# Patient Record
Sex: FEMALE | Race: BLACK OR AFRICAN AMERICAN
[De-identification: names, ages, dates, MRNs, and addresses within clinical notes are randomized per-mention and may not be internally consistent; named-entity substitution may affect disease eponyms.]

---

## 2017-06-01 ENCOUNTER — HOSPITAL ENCOUNTER (EMERGENCY)
Dept: HOSPITAL 61 - ER | Age: 72
Discharge: HOME | End: 2017-06-01
Payer: MEDICARE

## 2017-06-01 VITALS — SYSTOLIC BLOOD PRESSURE: 146 MMHG | DIASTOLIC BLOOD PRESSURE: 69 MMHG

## 2017-06-01 VITALS — HEIGHT: 63 IN | WEIGHT: 125 LBS | BODY MASS INDEX: 22.15 KG/M2

## 2017-06-01 DIAGNOSIS — R45.1: ICD-10-CM

## 2017-06-01 DIAGNOSIS — Z88.8: ICD-10-CM

## 2017-06-01 DIAGNOSIS — R53.1: Primary | ICD-10-CM

## 2017-06-01 DIAGNOSIS — N39.0: ICD-10-CM

## 2017-06-01 DIAGNOSIS — M19.90: ICD-10-CM

## 2017-06-01 LAB
ALBUMIN SERPL-MCNC: 3.6 G/DL (ref 3.4–5)
ALBUMIN/GLOB SERPL: 0.8 {RATIO} (ref 1–1.7)
ALP SERPL-CCNC: 56 U/L (ref 46–116)
ALT SERPL-CCNC: 22 U/L (ref 14–59)
ANION GAP SERPL CALC-SCNC: 10 MMOL/L (ref 6–14)
AST SERPL-CCNC: 16 U/L (ref 15–37)
BACTERIA #/AREA URNS HPF: 0 /HPF
BASOPHILS # BLD AUTO: 0 X10^3/UL (ref 0–0.2)
BASOPHILS NFR BLD: 0 % (ref 0–3)
BILIRUB SERPL-MCNC: 0.4 MG/DL (ref 0.2–1)
BILIRUB UR QL STRIP: NEGATIVE
BUN SERPL-MCNC: 10 MG/DL (ref 7–20)
BUN/CREAT SERPL: 14 (ref 6–20)
CALCIUM SERPL-MCNC: 8.6 MG/DL (ref 8.5–10.1)
CHLORIDE SERPL-SCNC: 103 MMOL/L (ref 98–107)
CO2 SERPL-SCNC: 30 MMOL/L (ref 21–32)
CREAT SERPL-MCNC: 0.7 MG/DL (ref 0.6–1)
EOSINOPHIL NFR BLD: 0 % (ref 0–3)
ERYTHROCYTE [DISTWIDTH] IN BLOOD BY AUTOMATED COUNT: 13.8 % (ref 11.5–14.5)
GFR SERPLBLD BASED ON 1.73 SQ M-ARVRAT: 100.4 ML/MIN
GLOBULIN SER-MCNC: 4.4 G/DL (ref 2.2–3.8)
GLUCOSE SERPL-MCNC: 88 MG/DL (ref 70–99)
GLUCOSE UR STRIP-MCNC: NEGATIVE MG/DL
HCT VFR BLD CALC: 35 % (ref 36–47)
HGB BLD-MCNC: 11.5 G/DL (ref 12–15.5)
LYMPHOCYTES # BLD: 1.4 X10^3/UL (ref 1–4.8)
LYMPHOCYTES NFR BLD AUTO: 13 % (ref 24–48)
MAGNESIUM SERPL-MCNC: 1.6 MG/DL (ref 1.8–2.4)
MCH RBC QN AUTO: 32 PG (ref 25–35)
MCHC RBC AUTO-ENTMCNC: 33 G/DL (ref 31–37)
MCV RBC AUTO: 96 FL (ref 79–100)
MONOCYTES NFR BLD: 4 % (ref 0–9)
NEUTROPHILS NFR BLD AUTO: 83 % (ref 31–73)
NITRITE UR QL STRIP: NEGATIVE
PH UR STRIP: 6 [PH]
PLATELET # BLD AUTO: 165 X10^3/UL (ref 140–400)
POTASSIUM SERPL-SCNC: 3.6 MMOL/L (ref 3.5–5.1)
PROT SERPL-MCNC: 8 G/DL (ref 6.4–8.2)
PROT UR STRIP-MCNC: NEGATIVE MG/DL
RBC # BLD AUTO: 3.63 X10^6/UL (ref 3.5–5.4)
RBC #/AREA URNS HPF: (no result) /HPF (ref 0–2)
SODIUM SERPL-SCNC: 143 MMOL/L (ref 136–145)
SP GR UR STRIP: 1.01
SQUAMOUS #/AREA URNS LPF: (no result) /LPF
UROBILINOGEN UR-MCNC: 0.2 MG/DL
WBC # BLD AUTO: 11.4 X10^3/UL (ref 4–11)
WBC #/AREA URNS HPF: (no result) /HPF (ref 0–4)

## 2017-06-01 PROCEDURE — 96366 THER/PROPH/DIAG IV INF ADDON: CPT

## 2017-06-01 PROCEDURE — A9540 TC99M MAA: HCPCS

## 2017-06-01 PROCEDURE — 84484 ASSAY OF TROPONIN QUANT: CPT

## 2017-06-01 PROCEDURE — 96361 HYDRATE IV INFUSION ADD-ON: CPT

## 2017-06-01 PROCEDURE — 83735 ASSAY OF MAGNESIUM: CPT

## 2017-06-01 PROCEDURE — A9558 XE133 XENON 10MCI: HCPCS

## 2017-06-01 PROCEDURE — 71010: CPT

## 2017-06-01 PROCEDURE — 96365 THER/PROPH/DIAG IV INF INIT: CPT

## 2017-06-01 PROCEDURE — 78582 LUNG VENTILAT&PERFUS IMAGING: CPT

## 2017-06-01 PROCEDURE — 87040 BLOOD CULTURE FOR BACTERIA: CPT

## 2017-06-01 PROCEDURE — 81001 URINALYSIS AUTO W/SCOPE: CPT

## 2017-06-01 PROCEDURE — 80053 COMPREHEN METABOLIC PANEL: CPT

## 2017-06-01 PROCEDURE — 36415 COLL VENOUS BLD VENIPUNCTURE: CPT

## 2017-06-01 PROCEDURE — 99285 EMERGENCY DEPT VISIT HI MDM: CPT

## 2017-06-01 PROCEDURE — 85027 COMPLETE CBC AUTOMATED: CPT

## 2017-06-01 PROCEDURE — 93005 ELECTROCARDIOGRAM TRACING: CPT

## 2017-06-01 PROCEDURE — 82310 ASSAY OF CALCIUM: CPT

## 2017-06-01 PROCEDURE — 96374 THER/PROPH/DIAG INJ IV PUSH: CPT

## 2017-06-01 PROCEDURE — 70450 CT HEAD/BRAIN W/O DYE: CPT

## 2017-06-01 PROCEDURE — 83605 ASSAY OF LACTIC ACID: CPT

## 2017-06-01 PROCEDURE — 85379 FIBRIN DEGRADATION QUANT: CPT

## 2017-06-01 PROCEDURE — 83880 ASSAY OF NATRIURETIC PEPTIDE: CPT

## 2017-06-01 NOTE — RAD
INDICATION: weakness and muscle twitching today, no priors, hx multiple 

myeloma

 

COMPARISON: None.

 

TECHNIQUE: 

 

Axial CT images obtained through the head without intravenous contrast.

 

One or more of the following individualized dose reduction techniques were

utilized for this examination:  1. Automated exposure control;  2. 

Adjustment of the mA and/or kV according to patient size;  3. Use of 

iterative reconstruction technique.

 

FINDINGS:

 

No intracranial hemorrhage.

No midline shift.  Basal cisterns patents.

Ventricles and sulci are globally prominent.

No acute osseous abnormality.

Orbits and paranasal sinuses unremarkable.

Scattered foci of low attenuation within the white matter.

 

IMPRESSION:

 

1. No acute intracranial hemorrhage.

2. Scattered regions of low attenuation within the white matter.  

Non-specific in nature but frequently secondary to small vessel ischemic 

disease. Other common causes include sequela of demyelination or migraine.

If there is clinical concern for acute etiology MRI could better evaluate 

as to whether any of these foci are acute.

3. Prominence of ventricles and sulci which is frequently secondary to age

related volume loss.

 

Electronically signed by: Tavon Cervantes MD (6/1/2017 5:54 PM)

## 2017-06-01 NOTE — RAD
Indication: Weakness with elevated d-dimer

 

Technique: Static images are obtained of both lungs following inhalation 

of 18 mCi xenon-133 and again following IV administration of 5 mCi of 99 M

technetium MAA. 

 

Comparison: Chest x-ray from same day

 

Findings:

No definite mismatched defects that are worse on the perfusion when 

compared to the ventilation images.

Patchy defects are seen both on the ventilation and perfusion images. Some

of the defects are better seen on the oblique and lateral images.

 

Impression: 

 

1. Low probability of pulmonary embolus.

 

Electronically signed by: Tavon Cervantes MD (6/1/2017 9:54 PM)

## 2017-06-01 NOTE — PHYS DOC
Past Medical History


Past Medical History:  Arthritis, GERD, Other


Additional Past Medical Histor:  Multiple Myeloma,Chemotherapy, cellulitis


Past Surgical History:  Knee Replacement, Other


Additional Past Surgical Histo:  Uterine prolapse,Stem Cell Transplant in 2013, 

cervical spinal stenosis


Alcohol Use:  None


Drug Use:  None





Adult General


Chief Complaint


Chief Complaint:  OTHER COMPLAINTS





[facility]


HPI





Patient is a 71  year old -American female who was in her normal state 

of health today until the  noted increased weakness with difficulty 

walking. She is given a history of multiple myeloma and leukemia as required 

chemotherapy and radiation therapy and she's had stem cell transplant 2. That 

was some years ago and she has been seen and managed by her oncologist at [facility]. She typically does not have any issue ambulating other than for 

some muscle rigidity but  noted this morning during some agitation that 

she had difficulty walking and sitting from a recumbent position to upright 

position. She admits to no change in medications, no changes in vision, no 

dysarthria, no arthralgia, no recent joint swelling, no shortness of breath, 

change chest pain, URI symptoms, fever, chills, headache, or other focal 

neurologic deficits. She globally feels weak any symptoms began this morning 

around 9 AM.


She denies any pain, travel outside the country, recent antibiotics, UTI 

symptoms or changes in bowel or bladder habits. She has a history of bladder 

surgery, partially directly, stem cell transplant 2, prior neck surgery.





[facility] primary care doctor, Dr. Quach is her oncologist. At .





Assessment and plan is generalized weakness. Patient is well outside the window 

for TPA consideration given her symptoms but differential diagnosis includes 

stroke, anemia, hypercalcemia, electrolyte abdomen, acute coronary syndrome, 

dehydration, UTI, sepsis, pulmonary embolism, as well as competitions from 

leukemia. At this point I will order a CT of the head, chest x-ray, EKG with 

appropriate cardiac workup, ionized calcium level,mag level, CMP, CBC, and 

urinalysis.





Review of Systems


Review of Systems





Constitutional: Denies fever or chills complains of generalized weakness


Eyes: Denies change in visual acuity, redness, or eye pain []


HENT: Denies nasal congestion or sore throat []


Respiratory: Denies cough or shortness of breath []


Cardiovascular: No additional information not addressed in HPI []


GI: Denies abdominal pain, nausea, vomiting, bloody stools or diarrhea []


: Denies dysuria or hematuria []


Musculoskeletal: Denies back pain or joint pain []


Integument: Denies rash or skin lesions []


Neurologic: Denies headache, she generally feels weak


Endocrine: Denies polyuria or polydipsia []





Current Medications


Current Medications





Current Medications








 Medications


  (Trade)  Dose


 Ordered  Sig/Sumit  Start Time


 Stop Time Status Last Admin


Dose Admin


 


 Magnesium Sulfate/


 Dextrose  50 ml @ 25


 mls/hr  1X  ONCE  17 18:45


 17 20:44 DC 17 19:22


25 MLS/HR


 


 Sodium Chloride  1,000 ml @ 


 1,000 mls/hr  Q1H  17 17:30


 17 18:29 DC 17 18:10


1,000 MLS/HR


 


 Sodium Chloride


  (Normal Saline


 Flush)  10 ml  QSHIFT  PRN  17 17:30


    17 19:22


10 ML











Allergies


Allergies





Allergies








Coded Allergies Type Severity Reaction Last Updated Verified


 


  metoclopramide HCl Allergy Unknown  3/26/14 No











Physical Exam


Physical Exam


A she noted to have a fever as well as tachycardia of 114 approprioate per for 

fever normotensive without hypoxia. Noted increased respiratory rate 22.


Constitutional: Well developed, well nourished, patient has extensive tardive 

dyskinesia but is in no distress.


HENT: Normocephalic, atraumatic, bilateral external ears normal, oropharynx 

moist, no oral exudates, nose normal. []


Eyes: PERRLA, EOMI, conjunctiva normal, no discharge. [] 


Neck: Normal range of motion, no tenderness, supple, no stridor. [] 


Cardiovascular:Heart rate regular rhythm, no murmur []


Lungs & Thorax:  Bilateral breath sounds clear to auscultation []


Abdomen: Bowel sounds normal, soft, no tenderness, no masses, no pulsatile 

masses. [] 


Skin: Warm, dry, no erythema, no rash. [] 


Back: No tenderness, no CVA tenderness. [] 


Extremities: No tenderness, no cyanosis, no clubbing, ROM intact, no edema. [] 


Neurologic: Alert and oriented X 3, normal motor function, normal sensory 

function, no focal deficits noted. Patient has decreased range of motion at the 

left shoulder secondary to pain not weakness. Patient's NIH stroke scale is 0 

at this time.


Psychologic: Affect normal, judgement normal, mood normal. []





Current Patient Data


Vital Signs





 Vital Signs








  Date Time  Temp Pulse Resp B/P (MAP) Pulse Ox O2 Delivery O2 Flow Rate FiO2


 


17 17:11 100.9 123 22 153/69 (97) 94 Room Air  





 100.9       








Lab Values





 Laboratory Tests








Test


  17


18:00


 


White Blood Count


  11.4 x10^3/uL


(4.0-11.0)  H


 


Red Blood Count


  3.63 x10^6/uL


(3.50-5.40)


 


Hemoglobin


  11.5 g/dL


(12.0-15.5)  L


 


Hematocrit


  35.0 %


(36.0-47.0)  L


 


Mean Corpuscular Volume


  96 fL ()


 


 


Mean Corpuscular Hemoglobin 32 pg (25-35)  


 


Mean Corpuscular Hemoglobin


Concent 33 g/dL


(31-37)


 


Red Cell Distribution Width


  13.8 %


(11.5-14.5)


 


Platelet Count


  165 x10^3/uL


(140-400)


 


Neutrophils (%) (Auto) 83 % (31-73)  H


 


Lymphocytes (%) (Auto) 13 % (24-48)  L


 


Monocytes (%) (Auto) 4 % (0-9)  


 


Eosinophils (%) (Auto) 0 % (0-3)  


 


Basophils (%) (Auto) 0 % (0-3)  


 


Neutrophils # (Auto)


  9.4 x10^3uL


(1.8-7.7)  H


 


Lymphocytes # (Auto)


  1.4 x10^3/uL


(1.0-4.8)


 


Monocytes # (Auto)


  0.5 x10^3/uL


(0.0-1.1)


 


Eosinophils # (Auto)


  0.0 x10^3/uL


(0.0-0.7)


 


Basophils # (Auto)


  0.0 x10^3/uL


(0.0-0.2)


 


D-Dimer (Dara)


  1.70 ug/mlFEU


(0.00-0.50)  H


 


Urine Collection Type U cath  


 


Urine Color Yellow  


 


Urine Clarity Clear  


 


Urine pH 6.0  


 


Urine Specific Gravity 1.015  


 


Urine Protein


  Negative mg/dL


(NEG-TRACE)


 


Urine Glucose (UA)


  Negative mg/dL


(NEG)


 


Urine Ketones (Stick)


  Negative mg/dL


(NEG)


 


Urine Blood Small (NEG)  


 


Urine Nitrite


  Negative (NEG)


 


 


Urine Bilirubin


  Negative (NEG)


 


 


Urine Urobilinogen Dipstick


  0.2 mg/dL (0.2


mg/dL)


 


Urine Leukocyte Esterase


  Negative (NEG)


 


 


Urine RBC


  6-10 /HPF


(0-2)


 


Urine WBC


  1-4 /HPF (0-4)


 


 


Urine Squamous Epithelial


Cells Few /LPF  


 


 


Urine Bacteria


  0 /HPF (0-FEW)


 


 


Urine Mucus Mod /LPF  


 


Sodium Level


  143 mmol/L


(136-145)


 


Potassium Level


  3.6 mmol/L


(3.5-5.1)


 


Chloride Level


  103 mmol/L


()


 


Carbon Dioxide Level


  30 mmol/L


(21-32)


 


Anion Gap 10 (6-14)  


 


Blood Urea Nitrogen


  10 mg/dL


(7-20)


 


Creatinine


  0.7 mg/dL


(0.6-1.0)


 


Estimated GFR


(Cockcroft-Gault) 100.4  


 


 


BUN/Creatinine Ratio 14 (6-20)  


 


Glucose Level


  88 mg/dL


(70-99)


 


Lactic Acid Level


  1.2 mmol/L


(0.4-2.0)


 


Calcium Level


  8.6 mg/dL


(8.5-10.1)


 


Ionized Calcium


  1.17 mmol/L


(1.13-1.32)


 


Magnesium Level


  1.6 mg/dL


(1.8-2.4)  L


 


Total Bilirubin


  0.4 mg/dL


(0.2-1.0)


 


Aspartate Amino Transferase


(AST) 16 U/L (15-37)


 


 


Alanine Aminotransferase (ALT)


  22 U/L (14-59)


 


 


Alkaline Phosphatase


  56 U/L


()


 


Troponin I Quantitative


  < 0.017 ng/mL


(0.000-0.055)


 


NT-Pro-B-Type Natriuretic


Peptide 42 pg/mL


(0-124)


 


Total Protein


  8.0 g/dL


(6.4-8.2)


 


Albumin


  3.6 g/dL


(3.4-5.0)


 


Albumin/Globulin Ratio


  0.8 (1.0-1.7)


L





 Laboratory Tests


17 18:00








 Laboratory Tests


17 18:00











EKG


EKG


[] EKG timed imaging 1716 2017 read by Dr. Romero demonstrates sinus 

tachycardia rate 114 is P waves. QRS normal limits at 67 ms patient has not 

severe T wave flattening in lateral leads. Patient is no ST segment or T-wave 

changes consistent with ischemia.





Radiology/Procedures


Radiology/Procedures


[] [address] [place] [address]


 [phone]


 


 IMAGING REPORT





 Signed





PATIENT: JUAN DIEGO MCLAIN ACCOUNT: [id number] MRN#: [id number]


: 1945 LOCATION: ER AGE: 71


SEX: F EXAM DT: 17 ACCESSION#: [id number]


STATUS: REG ER ORD. PHYSICIAN: CANDIDA ROMERO MD 


REASON: weakness


PROCEDURE: CT HEAD WO CONTRAST





 


INDICATION: weakness and muscle twitching today, no priors, hx multiple 


myeloma


 


COMPARISON: None.


 


TECHNIQUE: 


 


Axial CT images obtained through the head without intravenous contrast.


 


One or more of the following individualized dose reduction techniques were


utilized for this examination:  1. Automated exposure control;  2. 


Adjustment of the mA and/or kV according to patient size;  3. Use of 


iterative reconstruction technique.


 


FINDINGS:


 


No intracranial hemorrhage.


No midline shift.  Basal cisterns patents.


Ventricles and sulci are globally prominent.


No acute osseous abnormality.


Orbits and paranasal sinuses unremarkable.


Scattered foci of low attenuation within the white matter.


 


IMPRESSION:


 


1. No acute intracranial hemorrhage.


2. Scattered regions of low attenuation within the white matter.  


Non-specific in nature but frequently secondary to small vessel ischemic 


disease. Other common causes include sequela of demyelination or migraine.


If there is clinical concern for acute etiology MRI could better evaluate 


as to whether any of these foci are acute.


3. Prominence of ventricles and sulci which is frequently secondary to age


related volume loss.


 


Electronically signed by: Harris Padilla MD (2017 5:54 PM)














DICTATED and SIGNED BY:     HARRIS PADILLA MD


DATE:     17 1750





CC: CANDIDA ROMEOR MD; DANK SEO MD ~








 [facility]


 [address] [place] [address]


 [phone]


 


 IMAGING REPORT





 Signed





PATIENT: JUAN DIEGO MCLAIN ACCOUNT: [id number] MRN#: [id number]


: 1945 LOCATION: ER AGE: 71


SEX: F EXAM DT: 17 ACCESSION#: [id number]


STATUS: REG ER ORD. PHYSICIAN: CANDIDA ROMERO MD 


REASON: weakness with dimer elevation not eligable for CTA chest


PROCEDURE: LUNG VENT/PERFUSION SCAN(VQ)





 


Indication: Weakness with elevated d-dimer


 


Technique: Static images are obtained of both lungs following inhalation 


of 18 mCi xenon-133 and again following IV administration of 5 mCi of 99 M


technetium MAA. 


 


Comparison: Chest x-ray from same day


 


Findings:


No definite mismatched defects that are worse on the perfusion when 


compared to the ventilation images.


Patchy defects are seen both on the ventilation and perfusion images. Some


of the defects are better seen on the oblique and lateral images.


 


Impression: 


 


1. Low probability of pulmonary embolus.


 


Electronically signed by: Harris Padilla MD (2017 9:54 PM)














DICTATED and SIGNED BY:     HARRIS PADILLA MD


DATE:     17





CC: CANDIDA ROMERO MD; DANK SEO MD ~





Course & Med Decision Making


Course & Med Decision Making


Pertinent Labs and Imaging studies reviewed. (See chart for details) of the 

course the patient's evaluation the only abdomen mildly sound on laboratory 

work weren't elevated d-dimer which could represent a pulmonary most given her 

increased respiratory rate. Respiratory rate may be at normal baseline for her 

as she is deconditioned concerning her medical problems. So has low mag 1.6 

which may or may not contributory symptoms almost no evidence of hypokalemia or 

hypocalcemia. She'll be placed here in the emergency department. At this point 

results were explained to family at the bedside at approximately 1820. Patient 

feels better after fluids in the emergency. His urinalysis demonstrates mild 

white blood cells and bacteria are also has a few epithelial cells and given 

generalized weakness of patient with increased movement abnormalities I will 

treat empirically as UTI.





[] Patient tells me that their sumptoms given during CC are improved.  We 

reviewed labs and radiology reports with patient and any family at bedside. At 

approximately 1930. Still waiting for VQ scan.





Patient tells me that their sumptoms given during CC are improved.  We reviewed 

labs and radiology reports with patient and any family at bedside. Patient is a 

little agitated at this time waiting for VQ scan to be completed times a 

proximally 8:40 PM patient was offered a meal fluids and her gabapentin.





Consultant note:





Consultant called at of the service internal medicine PCP Dr. Gabbi herbert 

initially at 1852


Consult called back at 1900





Discussed the case I presented and they agreed with disposition home provided 

VQ scan is normal the patient is feeling better and able to walk with 

assistance according to the . At this point VQ scan is no returned at 

approximately 10 PM patient will be discharged home with follow-up with her 

primary care doctor with generalized weakness debility





Impression: Generalized weakness. Probable early UTI disposition PCP follow-up 

in 24-48 hours for weakness with no signs of stroke precautions given.





Dragon Disclaimer


Dragon Disclaimer


This electronic medical record was generated, in whole or in part, using a 

voice recognition dictation system.





Departure


Departure


Impression:  


 Primary Impression:  


 Weakness


 Additional Impression:  


 UTI (urinary tract infection)


Disposition:   HOME, SELF-CARE


Condition:  IMPROVED


Referrals:  


DANK SEO MD (PCP)


Patient Instructions:  Urinary Tract Infection, Weakness





Additional Instructions:  


These follow-up with your primary care doctor next 12-24 hours if your symptoms 

continue. Advise that you return immediately for any increased weakness 

generalized fatigue fevers greater than 102.2 despite treatment or if you any 

question concerns.


Scripts


Acetaminophen (TYLENOL) 325 Mg Tablet


1-2 TAB PO QID, #60 TAB 2 Refills


   Prov: CANDIDA ROMERO MD         17 


Ciprofloxacin Hcl (CIPRO) 500 Mg Tablet


1 TAB PO BID, #20 TAB


   Prov: CANDIDA ROMERO MD         17





Problem Qualifiers











CANDIDA ROMERO MD 2017 17:18

## 2017-06-02 NOTE — RAD
Exam:  AP portable chest. 



History:  Weakness. Multiple myeloma.



Comparison:  3/27/2014.



Findings:  The heart and mediastinal structures are within normal limits for

size.  Lungs are without infiltrate.  No pneumothorax or pleural effusion is

appreciated.  Right chest port and catheter are unchanged. Right greater than

left shoulder degeneration is present. S-shaped scoliosis of the thoracic and

upper lumbar spine is seen.



Impression:  

1.  No acute cardiopulmonary process.

## 2017-06-02 NOTE — EKG
[facility]

              [address] [place] [address]

Test Date:    2017               Test Time:    17:16:17

Pat Name:     JUAN DIEGO MCLAIN            Department:   

Patient ID:   [id number]           Room:          

Gender:       F                        Technician:   

:          1945               Requested By: CANDIDA ROMERO

Order Number: [id number]            Reading MD:   Pedro Cox

                                 Measurements

Intervals                              Axis          

Rate:         114                      P:            -40

DE:           116                      QRS:          7

QRSD:         76                       T:            11

QT:           292                                    

QTc:          406                                    

                           Interpretive Statements

SINUS TACHYCARDIA

NON SPECIFIC T ABNORMALITY

RI6.01          Unconfirmed report

Compared to ECG 2014 20:23:19

T-wave abnormality now present

Left-axis deviation no longer present



Electronically Signed On 2017 9:22:06 CDT by Pedro Cox

## 2017-09-19 ENCOUNTER — HOSPITAL ENCOUNTER (OUTPATIENT)
Dept: HOSPITAL 61 - SURG | Age: 72
Discharge: HOME | End: 2017-09-19
Attending: INTERNAL MEDICINE
Payer: MEDICARE

## 2017-09-19 VITALS
DIASTOLIC BLOOD PRESSURE: 70 MMHG | SYSTOLIC BLOOD PRESSURE: 160 MMHG | DIASTOLIC BLOOD PRESSURE: 70 MMHG | SYSTOLIC BLOOD PRESSURE: 160 MMHG | SYSTOLIC BLOOD PRESSURE: 160 MMHG | DIASTOLIC BLOOD PRESSURE: 70 MMHG

## 2017-09-19 DIAGNOSIS — K21.9: ICD-10-CM

## 2017-09-19 DIAGNOSIS — M19.91: ICD-10-CM

## 2017-09-19 DIAGNOSIS — Z86.69: ICD-10-CM

## 2017-09-19 DIAGNOSIS — Z87.19: ICD-10-CM

## 2017-09-19 DIAGNOSIS — Z87.39: ICD-10-CM

## 2017-09-19 DIAGNOSIS — F32.9: ICD-10-CM

## 2017-09-19 DIAGNOSIS — Z88.1: ICD-10-CM

## 2017-09-19 DIAGNOSIS — Z96.652: ICD-10-CM

## 2017-09-19 DIAGNOSIS — Z88.8: ICD-10-CM

## 2017-09-19 DIAGNOSIS — F41.9: ICD-10-CM

## 2017-09-19 DIAGNOSIS — Z09: Primary | ICD-10-CM

## 2019-06-04 ENCOUNTER — HOSPITAL ENCOUNTER (OUTPATIENT)
Dept: HOSPITAL 61 - US | Age: 74
Discharge: HOME | End: 2019-06-04
Attending: INTERNAL MEDICINE
Payer: MEDICARE

## 2019-06-04 DIAGNOSIS — M79.89: Primary | ICD-10-CM

## 2019-06-04 PROCEDURE — 93971 EXTREMITY STUDY: CPT

## 2019-06-04 NOTE — RAD
EXAM: Left lower extremity venous Doppler.



HISTORY: Left lower extremity pain/swelling.    



COMPARISON: None.



FINDINGS: Grayscale and Doppler analysis of the left lower extremity deep

venous system was performed with graded compression and augmentation. The

common femoral, greater saphenous, superficial femoral, popliteal and calf

veins were assessed.



There is no evidence of deep venous thrombosis.    



IMPRESSION: 

1. No evidence of deep venous thrombosis.

## 2019-08-19 ENCOUNTER — HOSPITAL ENCOUNTER (OUTPATIENT)
Dept: HOSPITAL 61 - CT | Age: 74
Discharge: HOME | End: 2019-08-19
Attending: INTERNAL MEDICINE
Payer: MEDICARE

## 2019-08-19 DIAGNOSIS — I67.82: Primary | ICD-10-CM

## 2019-08-19 PROCEDURE — 70450 CT HEAD/BRAIN W/O DYE: CPT

## 2019-08-19 PROCEDURE — 72125 CT NECK SPINE W/O DYE: CPT

## 2019-08-19 NOTE — RAD
PQRS Compliance statement:

 

One or more of the following individualized dose reduction techniques were

utilized for this examination:

1. Automated exposure control.

2. Adjustment of the mA and/or kV according to patient size.

3. Use of iterative reconstruction technique.

 

Indication:Fall. Left frontal headache and neck pain.

 

TECHNIQUE: CT head without IV contrast

 

COMPARISON: CT head from 6/1/2017

 

FINDINGS:

No pathologic extra-axial or intra-axial fluid collection. The ventricles 

and basal cisterns are within normal limits. Periventricular and deep 

white matter low-attenuation noted. No acute intracranial bleed. No focal 

loss of gray-white differentiation. Orbits are within normal limits. No 

large scalp hematoma. No acute calvarial fractures. Visualized paranasal 

sinuses and mastoid air cells are clear.

 

IMPRESSION:

1. No acute intracranial bleed or calvarial fracture.

2. White matter changes likely secondary to chronic microvascular ischemic

disease. If concern for acute ischemic stroke is high, please consider MRI

brain.

 

Indication:Neck pain.

 

TECHNIQUE: CT of the cervical spine without IV contrast with multiplanar 

reformats.

 

COMPARISON:None

 

FINDINGS:

There is loss of normal cervical lordosis. This could be due to muscle 

spasm, positioning or postoperative changes. Status post anterior fusion 

at C3-C4. Atlantoaxial joint interval is preserved. No compression 

deformity. Facet joints are in normal anatomic alignment. Multilevel 

intervertebral disc space narrowing seen with endplate irregularities and 

osteophyte formation. No acute fractures. Noncontrast appearance of the 

visualized neck soft tissue is within normal limits. Clear lung apices.

 

IMPRESSION:

1. No acute fractures.

2. Advanced multilevel degenerative disc disease with associated facet 

arthropathy.

 

Electronically signed by: Yasir Cao DO (8/19/2019 11:00 AM) [facility]

## 2019-10-24 ENCOUNTER — HOSPITAL ENCOUNTER (EMERGENCY)
Dept: HOSPITAL 61 - ER | Age: 74
Discharge: HOME | End: 2019-10-24
Payer: MEDICARE

## 2019-10-24 VITALS — DIASTOLIC BLOOD PRESSURE: 58 MMHG | SYSTOLIC BLOOD PRESSURE: 134 MMHG

## 2019-10-24 VITALS — HEIGHT: 62 IN | WEIGHT: 135 LBS | BODY MASS INDEX: 24.84 KG/M2
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
## 2019-10-24 DIAGNOSIS — C90.00: ICD-10-CM
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
## 2019-10-24 DIAGNOSIS — Z88.8: ICD-10-CM
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
## 2019-10-24 DIAGNOSIS — K21.9: ICD-10-CM

## 2019-10-24 DIAGNOSIS — R11.2: ICD-10-CM
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
## 2019-10-24 DIAGNOSIS — Z88.1: ICD-10-CM

## 2019-10-24 DIAGNOSIS — M19.90: ICD-10-CM

## 2019-10-24 DIAGNOSIS — Z96.659: ICD-10-CM

## 2019-10-24 DIAGNOSIS — E87.6: ICD-10-CM

## 2019-10-24 DIAGNOSIS — N39.0: Primary | ICD-10-CM

## 2019-10-24 DIAGNOSIS — R26.89: ICD-10-CM

## 2019-10-24 DIAGNOSIS — R50.9: ICD-10-CM

## 2019-10-24 LAB
ALBUMIN SERPL-MCNC: 3.7 G/DL (ref 3.4–5)
ALBUMIN/GLOB SERPL: 1.1 {RATIO} (ref 1–1.7)
ALP SERPL-CCNC: 66 U/L (ref 46–116)
ALT SERPL-CCNC: 22 U/L (ref 14–59)
ANION GAP SERPL CALC-SCNC: 7 MMOL/L (ref 6–14)
APTT PPP: YELLOW S
AST SERPL-CCNC: 19 U/L (ref 15–37)
BACTERIA #/AREA URNS HPF: 0 /HPF
BASOPHILS # BLD AUTO: 0 X10^3/UL (ref 0–0.2)
BASOPHILS NFR BLD: 1 % (ref 0–3)
BILIRUB SERPL-MCNC: 0.4 MG/DL (ref 0.2–1)
BILIRUB UR QL STRIP: NEGATIVE
BUN SERPL-MCNC: 8 MG/DL (ref 7–20)
BUN/CREAT SERPL: 13 (ref 6–20)
CALCIUM SERPL-MCNC: 9 MG/DL (ref 8.5–10.1)
CHLORIDE SERPL-SCNC: 105 MMOL/L (ref 98–107)
CK SERPL-CCNC: 219 U/L (ref 26–192)
CO2 SERPL-SCNC: 30 MMOL/L (ref 21–32)
CREAT SERPL-MCNC: 0.6 MG/DL (ref 0.6–1)
EOSINOPHIL NFR BLD: 0 % (ref 0–3)
EOSINOPHIL NFR BLD: 0 X10^3/UL (ref 0–0.7)
ERYTHROCYTE [DISTWIDTH] IN BLOOD BY AUTOMATED COUNT: 13.3 % (ref 11.5–14.5)
FIBRINOGEN PPP-MCNC: CLEAR MG/DL
GFR SERPLBLD BASED ON 1.73 SQ M-ARVRAT: 118.9 ML/MIN
GLOBULIN SER-MCNC: 3.4 G/DL (ref 2.2–3.8)
GLUCOSE SERPL-MCNC: 113 MG/DL (ref 70–99)
HCT VFR BLD CALC: 36.9 % (ref 36–47)
HGB BLD-MCNC: 12.3 G/DL (ref 12–15.5)
LYMPHOCYTES # BLD: 1.4 X10^3/UL (ref 1–4.8)
LYMPHOCYTES NFR BLD AUTO: 35 % (ref 24–48)
MAGNESIUM SERPL-MCNC: 1.8 MG/DL (ref 1.8–2.4)
MCH RBC QN AUTO: 33 PG (ref 25–35)
MCHC RBC AUTO-ENTMCNC: 33 G/DL (ref 31–37)
MCV RBC AUTO: 100 FL (ref 79–100)
MONO #: 0.3 X10^3/UL (ref 0–1.1)
MONOCYTES NFR BLD: 6 % (ref 0–9)
NEUT #: 2.4 X10^3/UL (ref 1.8–7.7)
NEUTROPHILS NFR BLD AUTO: 58 % (ref 31–73)
NITRITE UR QL STRIP: NEGATIVE
PH UR STRIP: 7 [PH]
PLATELET # BLD AUTO: 148 X10^3/UL (ref 140–400)
POTASSIUM SERPL-SCNC: 3.3 MMOL/L (ref 3.5–5.1)
PROT SERPL-MCNC: 7.1 G/DL (ref 6.4–8.2)
PROT UR STRIP-MCNC: NEGATIVE MG/DL
PROTHROMBIN TIME: 12.5 SEC (ref 11.7–14)
RBC # BLD AUTO: 3.68 X10^6/UL (ref 3.5–5.4)
RBC #/AREA URNS HPF: (no result) /HPF (ref 0–2)
SODIUM SERPL-SCNC: 142 MMOL/L (ref 136–145)
SQUAMOUS #/AREA URNS LPF: (no result) /LPF
UROBILINOGEN UR-MCNC: 0.2 MG/DL
WBC # BLD AUTO: 4.2 X10^3/UL (ref 4–11)
WBC #/AREA URNS HPF: (no result) /HPF (ref 0–4)

## 2019-10-24 PROCEDURE — 70450 CT HEAD/BRAIN W/O DYE: CPT

## 2019-10-24 PROCEDURE — 93005 ELECTROCARDIOGRAM TRACING: CPT

## 2019-10-24 PROCEDURE — 99285 EMERGENCY DEPT VISIT HI MDM: CPT

## 2019-10-24 PROCEDURE — 84484 ASSAY OF TROPONIN QUANT: CPT

## 2019-10-24 PROCEDURE — 36415 COLL VENOUS BLD VENIPUNCTURE: CPT

## 2019-10-24 PROCEDURE — 96361 HYDRATE IV INFUSION ADD-ON: CPT

## 2019-10-24 PROCEDURE — 83605 ASSAY OF LACTIC ACID: CPT

## 2019-10-24 PROCEDURE — 81001 URINALYSIS AUTO W/SCOPE: CPT

## 2019-10-24 PROCEDURE — 80053 COMPREHEN METABOLIC PANEL: CPT

## 2019-10-24 PROCEDURE — 83880 ASSAY OF NATRIURETIC PEPTIDE: CPT

## 2019-10-24 PROCEDURE — 96375 TX/PRO/DX INJ NEW DRUG ADDON: CPT

## 2019-10-24 PROCEDURE — 83735 ASSAY OF MAGNESIUM: CPT

## 2019-10-24 PROCEDURE — 87186 SC STD MICRODIL/AGAR DIL: CPT

## 2019-10-24 PROCEDURE — 82550 ASSAY OF CK (CPK): CPT

## 2019-10-24 PROCEDURE — 96374 THER/PROPH/DIAG INJ IV PUSH: CPT

## 2019-10-24 PROCEDURE — 72125 CT NECK SPINE W/O DYE: CPT

## 2019-10-24 PROCEDURE — 87086 URINE CULTURE/COLONY COUNT: CPT

## 2019-10-24 PROCEDURE — 85610 PROTHROMBIN TIME: CPT

## 2019-10-24 PROCEDURE — 85025 COMPLETE CBC W/AUTO DIFF WBC: CPT

## 2019-10-24 PROCEDURE — 71045 X-RAY EXAM CHEST 1 VIEW: CPT

## 2019-10-24 NOTE — PHYS DOC
Past Medical History


Past Medical History:  Arthritis, GERD, Other


Additional Past Medical Histor:  Multiple Myeloma,Chemotherapy, cellulitis


Past Surgical History:  Knee Replacement, Other


Additional Past Surgical Histo:  Uterine prolapse,Stem Cell Transplant in 2013, 

cervical spinal stenosis


Alcohol Use:  None


Drug Use:  None





Adult General


Chief Complaint


Chief Complaint:  MECHANICAL FALL





HPI


HPI





Patient is a 74  year old female who presents with complaining of a fall. 

Patient states she lost her balance and had a fall inside of her home and hit 

her head without loss of consciousness. Patient had another fall 2 weeks ago and

currently taking physical therapy treatment for balance problem that is going on

for several weeks. X-ray, fever and chills, neck pain, nausea and vomiting. 

Patient currently using a walker. Patient takes fentanyl patch and oxycodone for

multiple myeloma pain during of headache and rated her pain 6/10.





Review of Systems


Review of Systems





Constitutional: Denies fever or chills []


Eyes: Denies change in visual acuity, redness, or eye pain []


HENT: Denies nasal congestion or sore throat []


Respiratory: Denies cough or shortness of breath []


Cardiovascular: No additional information not addressed in HPI []


GI: Denies abdominal pain, nausea, vomiting, bloody stools or diarrhea []


: Denies dysuria or hematuria []


Musculoskeletal: Denies back pain or joint pain []


Integument: Denies rash or skin lesions []


Neurologic: Denies  focal weakness or sensory changes, reports []


Endocrine: Denies polyuria or polydipsia []





All other systems were reviewed and found to be within normal limits, except as 

documented in this note.





Current Medications


Current Medications





Current Medications








 Medications


  (Trade)  Dose


 Ordered  Sig/Sumit  Start Time


 Stop Time Status Last Admin


Dose Admin


 


 Fentanyl Citrate


  (Fentanyl 2ml


 Vial)  50 mcg  1X  ONCE  10/24/19 18:45


 10/24/19 18:46 DC 10/24/19 19:07


50 MCG


 


 Heparin Sodium


  (Porcine)


  (Hep Lock Adult)  500 unit  1X  ONCE  10/24/19 21:45


 10/24/19 21:46   





 


 Potassium Chloride


  (Klor-Con)  40 meq  1X  ONCE  10/24/19 21:30


 10/24/19 21:31 DC  





 


 Sodium Chloride  1,000 ml @ 


 1,000 mls/hr  1X  ONCE  10/24/19 19:30


 10/24/19 20:29 DC 10/24/19 19:59


1,000 MLS/HR











Allergies


Allergies





Allergies








Coded Allergies Type Severity Reaction Last Updated Verified


 


  amoxicillin Allergy Intermediate  17 Yes


 


  clavulanic acid Allergy Intermediate  17 Yes


 


  metoclopramide HCl Adverse Reaction Intermediate muscle tremors 10/24/19 No











Physical Exam


Physical Exam








Constitutional: Well developed, well nourished, mild distress, non-toxic 

appearance. []


HENT: Normocephalic, atraumatic.


Eyes: PERRLA, EOMI, conjunctiva normal, no discharge. [] 


Neck: Normal range of motion, no tenderness, supple, no stridor. [] 


Cardiovascular:Heart rate regular rhythm, no murmur []


Lungs & Thorax:  Bilateral breath sounds clear to auscultation []


Abdomen: Bowel sounds normal, soft, no tenderness, no masses, no pulsatile 

masses. [] 


Skin: Warm, dry, no erythema, no rash. [] 


Back: No tenderness, no CVA tenderness. [] 


Extremities: No tenderness, no cyanosis, no clubbing, ROM intact, no edema. [] 


Neurologic: Alert and oriented X 3, no focal deficits noted. []


Psychologic: Affect normal, judgement normal, mood normal. []





Current Patient Data


Vital Signs





                                   Vital Signs








  Date Time  Temp Pulse Resp B/P (MAP) Pulse Ox O2 Delivery O2 Flow Rate FiO2


 


10/24/19 19:30      Room Air  


 


10/24/19 19:07   16  97   


 


10/24/19 18:11 98.4 82  153/69 (97)    





 98.4       








Lab Values





                                Laboratory Tests








Test


 10/24/19


18:48 10/24/19


20:47


 


White Blood Count


 4.2 x10^3/uL


(4.0-11.0) 





 


Red Blood Count


 3.68 x10^6/uL


(3.50-5.40) 





 


Hemoglobin


 12.3 g/dL


(12.0-15.5) 





 


Hematocrit


 36.9 %


(36.0-47.0) 





 


Mean Corpuscular Volume


 100 fL


() 





 


Mean Corpuscular Hemoglobin 33 pg (25-35)   


 


Mean Corpuscular Hemoglobin


Concent 33 g/dL


(31-37) 





 


Red Cell Distribution Width


 13.3 %


(11.5-14.5) 





 


Platelet Count


 148 x10^3/uL


(140-400) 





 


Neutrophils (%) (Auto) 58 % (31-73)   


 


Lymphocytes (%) (Auto) 35 % (24-48)   


 


Monocytes (%) (Auto) 6 % (0-9)   


 


Eosinophils (%) (Auto) 0 % (0-3)   


 


Basophils (%) (Auto) 1 % (0-3)   


 


Neutrophils # (Auto)


 2.4 x10^3/uL


(1.8-7.7) 





 


Lymphocytes # (Auto)


 1.4 x10^3/uL


(1.0-4.8) 





 


Monocytes # (Auto)


 0.3 x10^3/uL


(0.0-1.1) 





 


Eosinophils # (Auto)


 0.0 x10^3/uL


(0.0-0.7) 





 


Basophils # (Auto)


 0.0 x10^3/uL


(0.0-0.2) 





 


Prothrombin Time


 12.5 SEC


(11.7-14.0) 





 


Prothrombin Time INR 1.0 (0.8-1.1)   


 


Sodium Level


 142 mmol/L


(136-145) 





 


Potassium Level


 3.3 mmol/L


(3.5-5.1)  L 





 


Chloride Level


 105 mmol/L


() 





 


Carbon Dioxide Level


 30 mmol/L


(21-32) 





 


Anion Gap 7 (6-14)   


 


Blood Urea Nitrogen


 8 mg/dL (7-20)


 





 


Creatinine


 0.6 mg/dL


(0.6-1.0) 





 


Estimated GFR


(Cockcroft-Gault) 118.9  


 





 


BUN/Creatinine Ratio 13 (6-20)   


 


Glucose Level


 113 mg/dL


(70-99)  H 





 


Lactic Acid Level


 0.7 mmol/L


(0.4-2.0) 





 


Calcium Level


 9.0 mg/dL


(8.5-10.1) 





 


Magnesium Level


 1.8 mg/dL


(1.8-2.4) 





 


Total Bilirubin


 0.4 mg/dL


(0.2-1.0) 





 


Aspartate Amino Transferase


(AST) 19 U/L (15-37)


 





 


Alanine Aminotransferase (ALT)


 22 U/L (14-59)


 





 


Alkaline Phosphatase


 66 U/L


() 





 


Creatine Kinase


 219 U/L


()  H 





 


Troponin I Quantitative


 < 0.017 ng/mL


(0.000-0.055) 





 


NT-Pro-B-Type Natriuretic


Peptide 299 pg/mL


(0-124)  H 





 


Total Protein


 7.1 g/dL


(6.4-8.2) 





 


Albumin


 3.7 g/dL


(3.4-5.0) 





 


Albumin/Globulin Ratio 1.1 (1.0-1.7)   


 


Urine Collection Type  Unknown  


 


Urine Color  Yellow  


 


Urine Clarity  Clear  


 


Urine pH  7.0  


 


Urine Specific Gravity  1.015  


 


Urine Protein


 


 Negative mg/dL


(NEG-TRACE)


 


Urine Glucose (UA)


 


 Negative mg/dL


(NEG)


 


Urine Ketones (Stick)


 


 Negative mg/dL


(NEG)


 


Urine Blood


 


 Negative (NEG)





 


Urine Nitrite


 


 Negative (NEG)





 


Urine Bilirubin


 


 Negative (NEG)





 


Urine Urobilinogen Dipstick


 


 0.2 mg/dL (0.2


mg/dL)


 


Urine Leukocyte Esterase


 


 Moderate (NEG)





 


Urine RBC


 


 3-5 /HPF (0-2)





 


Urine WBC


 


 5-10 /HPF


(0-4)


 


Urine Squamous Epithelial


Cells 


 Mod /LPF  





 


Urine Bacteria


 


 0 /HPF (0-FEW)





 


Urine Mucus  Slight /LPF  





                                Laboratory Tests


10/24/19 18:48








                                Laboratory Tests


10/24/19 18:48














EKG


EKG


EKG interpreted by me. EKG at 1913 showed normal sinus rhythm at rate of 73, 

leftward axis, T-wave abnormalities in anteroseptal leads, no acute ST and T-wa

ve lavation.





Radiology/Procedures


Radiology/Procedures


[][facility]


                    [address] [place] [address] [phone]


                                        


                                 IMAGING REPORT





                                     Signed





PATIENT: JUAN DIEGO MCLAIN  ACCOUNT: [id number]     MRN#: [id number]


: 1945           LOCATION: ER              AGE: 74


SEX: F                    EXAM DT: 10/24/19         ACCESSION#: [id number]


STATUS: REG ER            ORD. PHYSICIAN: MARYANN ROY MD


REASON: weakness and fall


PROCEDURE: PORTABLE CHEST 1V





 


AP portable chest  radiograph 10/24/2019


 


Clinical History: Weakness. Fall.


 


An AP erect portable digital radiograph of the chest was obtained.


 


Comparison study is dated 2017.


 


A right internal jugular Vqppec-n-Mbdq type catheter is unchanged in 


position. The cardiac silhouette is mildly enlarged. The thoracic aorta is


tortuous. Atherosclerotic calcification of the thoracic aorta is seen. The


degree of inspiration is shallow. No area of consolidation is seen. No 


pneumothorax or pleural effusion is noted. Degenerative changes are seen 


involving the thoracic spine and both shoulders. The osseous structures 


are grossly intact.


 


Impression: No acute abnormality is seen.


 


Electronically signed by: Callum Arreguin MD (10/24/2019 8:28 PM) [facility]














DICTATED and SIGNED BY:     CALLUM ARREGUIN MD


DATE:     10/24/19 2028


                            [facility]


                    [address] [place] [address]


                                 [phone]


                                        


                                 IMAGING REPORT





                                     Signed





PATIENT: JUAN DIEGO MCLAIN  ACCOUNT: [id number]     MRN#: [id number]


: 1945           LOCATION: ER              AGE: 74


SEX: F                    EXAM DT: 10/24/19         ACCESSION#: [id number]


STATUS: REG ER            ORD. PHYSICIAN: MARYANN ROY MD


REASON: fall at  home, history of multiple myeloma


PROCEDURE: CT HEAD AND CERVICAL SPINE WO





Exam: CT head and cervical spine


 


INDICATION: Fall at home


 


TECHNIQUE: Sequential axial images through the head and cervical spine 


were obtained without the administration of IV contrast.


 


Comparisons: None


 


FINDINGS:


 


Head:


No focal parenchymal lesion or hemorrhage is identified. There is no 


midline shift or sulcal effacement.


 


Patchy hypodensity within the periventricular white matter, likely 


representing chronic small vessel ischemic change, similar when compared 


to prior exam. No acute vascular territory infarction is identified. 


Gray-white distinction is preserved.


 


The ventricular system is within normal limits without compression 


hydrocephalus. The basal cisterns are well maintained.


 


The visualized portions of the paranasal sinuses and mastoid air cells are


well-pneumatized. No acute fractures.


 


Cervical spine:


There is straightening of the cervical spine. Vertebral body heights are 


well-maintained. Grade 1 anterolisthesis of C2 on C3 and C7 on T1.


 


Fracture through the cervical spine is not identified.


 


There is multilevel spondylotic change in the cervical spine with 


degenerative disc disease greatest at C3-C4 C5-C6 and C6-C7.


 


Visualized paraspinal soft tissues are unremarkable.


 


IMPRESSION:


1.  No acute intracranial abnormality.


2.  Negative CT C-spine for acute traumatic injury.


 


Exposure: One or more of the following in the visualized dose reduction 


techniques were utilized for this examination:


1.  Automated exposure control


2.  Adjustment of the MA and/or KV according to patient size


Use of iterative of reconstructive technique


 


Electronically signed by: Mateo Rodriguez MD (10/24/2019 7:46 PM) [facility]














DICTATED and SIGNED BY:     MATEO RODRIGUEZ MD


DATE:     10/24/19 1946





Course & Med Decision Making


Course & Med Decision Making


Pertinent Labs and Imaging studies reviewed. (See chart for details)





Evaluation of patient in ER showed 74-year-old female patient with history of 

multiple myeloma and fentanyl patch and Percocet and history of bunions problem 

for several weeks and physical therapy brought in by her  because of a 

fall and injury to her head. Patient was alert and oriented. Better after IV 

fentanyl. Potassium was 3.3 and patient states she is supposed respiratory 

symptoms but doesn't take her medication all the time because large size of the 

medication. Patient also had UTI. Lactic acid and white count was normal. CT 

head and chest x-ray was unremarkable. Patient advised to increase fluid intake 

and follow up with physical therapy. Patient ambulated with walking like her 

usual condition according to her .





Dragon Disclaimer


Dragon Disclaimer


This electronic medical record was generated, in whole or in part, using a voice

 recognition dictation system.





Departure


Departure


Impression:  


   Primary Impression:  


   Fall from standing


   Additional Impressions:  


   Balance problem


   Urinary tract infection


   Hypokalemia


   History of multiple myeloma


Disposition:  01 HOME, SELF-CARE (at 17)


Condition:  IMPROVED


Referrals:  


HAMILTON GUTIERREZ MD (PCP)


Patient Instructions:  Fall Prevention and Home Safety, Hypokalemia, Urinary 

Tract Infection





Additional Instructions:  


Drink plenty of liquids


Follow-up with your primary care physician in 3-5 days


Return to ER if not getting better


Continue with your physical therapist for balance problem


Scripts


Ciprofloxacin Hcl (CIPRO) 250 Mg Tablet


1 TAB PO BID for infection, #14 TAB


   Prov: MARYANN ROY MD         10/24/19





Problem Qualifiers








   Primary Impression:  


   Fall from standing


   Encounter type:  initial encounter  Qualified Codes:  W19.XXXA - Unspecified 

   fall, initial encounter


   Additional Impressions:  


   Urinary tract infection


   Urinary tract infection type:  site unspecified  Hematuria presence:  without

    hematuria  Qualified Codes:  N39.0 - Urinary tract infection, site not 

   specified








MARYANN ROY MD             Oct 24, 2019 21:19

## 2019-10-24 NOTE — RAD
Exam: CT head and cervical spine

 

INDICATION: Fall at home

 

TECHNIQUE: Sequential axial images through the head and cervical spine 

were obtained without the administration of IV contrast.

 

Comparisons: None

 

FINDINGS:

 

Head:

No focal parenchymal lesion or hemorrhage is identified. There is no 

midline shift or sulcal effacement.

 

Patchy hypodensity within the periventricular white matter, likely 

representing chronic small vessel ischemic change, similar when compared 

to prior exam. No acute vascular territory infarction is identified. 

Gray-white distinction is preserved.

 

The ventricular system is within normal limits without compression 

hydrocephalus. The basal cisterns are well maintained.

 

The visualized portions of the paranasal sinuses and mastoid air cells are

well-pneumatized. No acute fractures.

 

Cervical spine:

There is straightening of the cervical spine. Vertebral body heights are 

well-maintained. Grade 1 anterolisthesis of C2 on C3 and C7 on T1.

 

Fracture through the cervical spine is not identified.

 

There is multilevel spondylotic change in the cervical spine with 

degenerative disc disease greatest at C3-C4 C5-C6 and C6-C7.

 

Visualized paraspinal soft tissues are unremarkable.

 

IMPRESSION:

1.  No acute intracranial abnormality.

2.  Negative CT C-spine for acute traumatic injury.

 

Exposure: One or more of the following in the visualized dose reduction 

techniques were utilized for this examination:

1.  Automated exposure control

2.  Adjustment of the MA and/or KV according to patient size

Use of iterative of reconstructive technique

 

Electronically signed by: Mateo Perez MD (10/24/2019 7:46 PM) [facility]

## 2019-10-24 NOTE — RAD
AP portable chest  radiograph 10/24/2019

 

Clinical History: Weakness. Fall.

 

An AP erect portable digital radiograph of the chest was obtained.

 

Comparison study is dated 1/1/2017.

 

A right internal jugular Zjmpsu-z-Vnxk type catheter is unchanged in 

position. The cardiac silhouette is mildly enlarged. The thoracic aorta is

tortuous. Atherosclerotic calcification of the thoracic aorta is seen. The

degree of inspiration is shallow. No area of consolidation is seen. No 

pneumothorax or pleural effusion is noted. Degenerative changes are seen 

involving the thoracic spine and both shoulders. The osseous structures 

are grossly intact.

 

Impression: No acute abnormality is seen.

 

Electronically signed by: Callum Arreguin MD (10/24/2019 8:28 PM) [facility]

## 2019-10-25 NOTE — EKG
[facility]

              [address] [place] [address]

Test Date:    2019-10-24               Test Time:    19:13:52

Pat Name:     JUAN DIEGO MCLAIN            Department:   

Patient ID:   [id number]           Room:          

Gender:       F                        Technician:   

:          1945               Requested By: MARYANN ROY

Order Number: [id number]           Reading MD:   Dontae Joshi MD

                                 Measurements

Intervals                              Axis          

Rate:         73                       P:            0

NJ:           154                      QRS:          -12

QRSD:         94                       T:            -25

QT:           390                                    

QTc:          433                                    

                           Interpretive Statements

SINUS RHYTHM

POOR R WAVE PROGRESSION

Electronically Signed On 2019 8:21:30 CST by Dontae Joshi MD

## 2021-08-22 ENCOUNTER — HOSPITAL ENCOUNTER (EMERGENCY)
Dept: HOSPITAL 61 - ER | Age: 76
Discharge: HOME | End: 2021-08-22
Payer: MEDICARE

## 2021-08-22 VITALS — DIASTOLIC BLOOD PRESSURE: 64 MMHG | SYSTOLIC BLOOD PRESSURE: 146 MMHG

## 2021-08-22 VITALS — BODY MASS INDEX: 26.53 KG/M2 | HEIGHT: 60 IN | WEIGHT: 135.14 LBS

## 2021-08-22 DIAGNOSIS — F41.9: ICD-10-CM

## 2021-08-22 DIAGNOSIS — K21.9: ICD-10-CM

## 2021-08-22 DIAGNOSIS — Z88.8: ICD-10-CM

## 2021-08-22 DIAGNOSIS — R25.1: Primary | ICD-10-CM

## 2021-08-22 DIAGNOSIS — Z88.1: ICD-10-CM

## 2021-08-22 LAB
ALBUMIN SERPL-MCNC: 3.3 G/DL (ref 3.4–5)
ALBUMIN/GLOB SERPL: 0.8 {RATIO} (ref 1–1.7)
ALP SERPL-CCNC: 68 U/L (ref 46–116)
ALT SERPL-CCNC: 25 U/L (ref 14–59)
ANION GAP SERPL CALC-SCNC: 11 MMOL/L (ref 6–14)
AST SERPL-CCNC: 12 U/L (ref 15–37)
BASOPHILS # BLD AUTO: 0 X10^3/UL (ref 0–0.2)
BASOPHILS NFR BLD: 1 % (ref 0–3)
BILIRUB SERPL-MCNC: 0.5 MG/DL (ref 0.2–1)
BUN SERPL-MCNC: 8 MG/DL (ref 7–20)
BUN/CREAT SERPL: 11 (ref 6–20)
CALCIUM SERPL-MCNC: 9.1 MG/DL (ref 8.5–10.1)
CHLORIDE SERPL-SCNC: 103 MMOL/L (ref 98–107)
CO2 SERPL-SCNC: 30 MMOL/L (ref 21–32)
CREAT SERPL-MCNC: 0.7 MG/DL (ref 0.6–1)
EOSINOPHIL NFR BLD: 0.1 X10^3/UL (ref 0–0.7)
EOSINOPHIL NFR BLD: 2 % (ref 0–3)
ERYTHROCYTE [DISTWIDTH] IN BLOOD BY AUTOMATED COUNT: 16.2 % (ref 11.5–14.5)
GFR SERPLBLD BASED ON 1.73 SQ M-ARVRAT: 99 ML/MIN
GLUCOSE SERPL-MCNC: 92 MG/DL (ref 70–99)
HCT VFR BLD CALC: 32.6 % (ref 36–47)
HGB BLD-MCNC: 10.9 G/DL (ref 12–15.5)
LIPASE: 99 U/L (ref 73–393)
LYMPHOCYTES # BLD: 1.2 X10^3/UL (ref 1–4.8)
LYMPHOCYTES NFR BLD AUTO: 34 % (ref 24–48)
MAGNESIUM SERPL-MCNC: 1.6 MG/DL (ref 1.8–2.4)
MCH RBC QN AUTO: 33 PG (ref 25–35)
MCHC RBC AUTO-ENTMCNC: 34 G/DL (ref 31–37)
MCV RBC AUTO: 97 FL (ref 79–100)
MONO #: 0.4 X10^3/UL (ref 0–1.1)
MONOCYTES NFR BLD: 11 % (ref 0–9)
NEUT #: 1.8 X10^3/UL (ref 1.8–7.7)
NEUTROPHILS NFR BLD AUTO: 53 % (ref 31–73)
PLATELET # BLD AUTO: 210 X10^3/UL (ref 140–400)
POTASSIUM SERPL-SCNC: 3.4 MMOL/L (ref 3.5–5.1)
PROT SERPL-MCNC: 7.2 G/DL (ref 6.4–8.2)
RBC # BLD AUTO: 3.34 X10^6/UL (ref 3.5–5.4)
SODIUM SERPL-SCNC: 144 MMOL/L (ref 136–145)
WBC # BLD AUTO: 3.5 X10^3/UL (ref 4–11)

## 2021-08-22 PROCEDURE — 83880 ASSAY OF NATRIURETIC PEPTIDE: CPT

## 2021-08-22 PROCEDURE — 85025 COMPLETE CBC W/AUTO DIFF WBC: CPT

## 2021-08-22 PROCEDURE — 84484 ASSAY OF TROPONIN QUANT: CPT

## 2021-08-22 PROCEDURE — 83735 ASSAY OF MAGNESIUM: CPT

## 2021-08-22 PROCEDURE — 80053 COMPREHEN METABOLIC PANEL: CPT

## 2021-08-22 PROCEDURE — 70450 CT HEAD/BRAIN W/O DYE: CPT

## 2021-08-22 PROCEDURE — 36415 COLL VENOUS BLD VENIPUNCTURE: CPT

## 2021-08-22 PROCEDURE — 71045 X-RAY EXAM CHEST 1 VIEW: CPT

## 2021-08-22 PROCEDURE — 83690 ASSAY OF LIPASE: CPT

## 2021-08-22 NOTE — RAD
CT Head W/O Contrast:



History: Reason: ams h/o mm / Spl. Instructions:  / History: 



Comparison: none



Axial images were obtained without contrast.



There is moderate diffuse atrophy.  There is no mass effect, extraaxial fluid collections or hydrocep
halus.  There is no gross bleed.  Mild, patchy periventricular and subcortical white matter hypoatten
uation is seen.  There is no focal loss of gray-white matter distinction to suggest acute ischemia, i
.e. stroke.



Impression:  No acute findings.



RS Compliance Statement:



One or more of the following individualized dose reduction techniques were utilized for this examinat
ion:  

1. Automated exposure control  

2. Adjustment of the mA and/or kV according to patient size  

3. Use of iterative reconstruction technique



Electronically signed by: Michael Arthur III, MD (8/22/2021 3:40 PM) [facility]

## 2021-08-22 NOTE — RAD
XR CHEST 1V



History: Reason: near-syncope / Spl. Instructions:  / History: 



Comparison: October 24, 2019



Findings:

Patchy bibasilar opacities, left than right. No pleural effusion. No pneumothorax. Normal heart size.
 Stable right chest wall port. Advanced glenohumeral DJD, right greater than left.



Impression: 

1.  Mild left greater than right patchy bibasilar opacities, may represent atelectasis or developing 
consolidations



Electronically signed by: Maximilian Brasher DO (8/22/2021 2:51 PM) [id number]

## 2021-08-22 NOTE — PHYS DOC
Past Medical History


Past Medical History:  Arthritis, GERD, Other


Additional Past Medical Histor:  Multiple Myeloma,Chemotherapy, cellulitis.


Past Surgical History:  Knee Replacement, Other


Additional Past Surgical Histo:  Uterine prolapse,Stem Cell Transplant in 2013, 

cervical spinal stenosis


Smoking Status:  Never Smoker


Alcohol Use:  None


Drug Use:  None





General Adult


EDM:


Chief Complaint:  NEAR SYNCOPE





HPI:


HPI:


76-year-old -American female past medical history of multiple myeloma, 

follows at , presents to the ED with her , (patient consents to 

his/her/their knowledge and involvement in pts' medical care), concern for " I 

was acting fidgety and not walking well at all," 's lasted for 

approximately 10 to 20 minutes and symptoms resolved prior to ED arrival.  

Patient states she had similar symptoms approximately 1-2 weeks ago that they 

attempted to be seen at  emergency department but left due to long ED wait 

time.  Patients' pcp is Dr. Gutierrez.  Patient is taking Pomalyst every 21 days 

and has 1 week off.  Patient states she felt really nervous when these sxs 

happened and she was talking quickly, "I know I have some anxiety that I need to

talk to Gabbi about." States her arms were "fidgeting," no sxs in her legs. 

Has a h/o neuropathy but takes no antipsychotics.  Patient denies any associated

dizziness, vertiginous sensation, chest pain, dyspnea, exertional dyspnea, 

saddle anesthesia, back pain, night sweats, urinary or bowel retention or 

incontinence, unintended weight loss or arm/leg weakness.





Review of Systems:


Review of Systems:


Constitutional:   Denies fever or chills. []


Eyes:   Denies change in visual acuity. []


HENT:   Denies nasal congestion or sore throat. [] 


Respiratory:   Denies cough or shortness of breath. [] 


Cardiovascular:   Denies chest pain or edema. [] 


GI:   Denies  nausea, vomiting, bloody stools or diarrhea. [] 


:  Denies dysuria or hematuria


Musculoskeletal:   Denies back pain or joint pain. [] 


Integument:   Denies rash or diaphoresis


Neurologic:   Denies headache, focal weakness or sensory changes. [] 


Endocrine:   Denies polyuria or polydipsia. [] 


Lymphatic:  Denies swollen glands. [] 


Psychiatric:  Denies depression or suicidal thoughts





Heart Score:


C/O Chest Pain:  No


Risk Factors:


Risk Factors:  DM, Current or recent (<one month) smoker, HTN, HLP, family 

history of CAD, obesity.


Risk Scores:


Score 0 - 3:  2.5% MACE over next 6 weeks - Discharge Home


Score 4 - 6:  20.3% MACE over next 6 weeks - Admit for Clinical Observation


Score 7 - 10:  72.7% MACE over next 6 weeks - Early Invasive Strategies





Allergies:


Allergies:





Allergies








Coded Allergies Type Severity Reaction Last Updated Verified


 


  amoxicillin Allergy Intermediate  17 Yes


 


  clavulanic acid Allergy Intermediate  17 Yes


 


  metoclopramide HCl Adverse Reaction Intermediate muscle tremors 10/24/19 No











Physical Exam:


PE:





Constitutional: Well developed, well nourished, no acute distress, non-toxic 

appearance. 


HENT: Normocephalic, atraumatic,


Eyes: EOMI, conjunctiva normal, no discharge.  


Neck: Normal range of motion,  supple, 


Cardiovascular: S1/2 present, regular rhythm


Lungs & Thorax: Speaking in full sentences, bilateral equal chest rise, no 

tachypnea or increased work of breathing


Abdomen:  soft, no tenderness, 


Skin: Warm, dry, no erythema, no rash. [] 


Back: No tenderness, no CVA tenderness. [] 


Extremities: No tenderness, no cyanosis, no lower extremity edema


Neurologic:  NIHSS 0 Alert and oriented X 3, normal motor function, normal 

sensory function, no focal deficits noted. []


Psychologic: Affect normal, judgement normal, mood normal. []





Current Patient Data:


Labs:





                                Laboratory Tests








Test


 21


14:35


 


White Blood Count


 3.5 x10^3/uL


(4.0-11.0)  L


 


Red Blood Count


 3.34 x10^6/uL


(3.50-5.40)  L


 


Hemoglobin


 10.9 g/dL


(12.0-15.5)  L


 


Hematocrit


 32.6 %


(36.0-47.0)  L


 


Mean Corpuscular Volume


 97 fL ()





 


Mean Corpuscular Hemoglobin 33 pg (25-35)  


 


Mean Corpuscular Hemoglobin


Concent 34 g/dL


(31-37)


 


Red Cell Distribution Width


 16.2 %


(11.5-14.5)  H


 


Platelet Count


 210 x10^3/uL


(140-400)


 


Neutrophils (%) (Auto) 53 % (31-73)  


 


Lymphocytes (%) (Auto) 34 % (24-48)  


 


Monocytes (%) (Auto) 11 % (0-9)  H


 


Eosinophils (%) (Auto) 2 % (0-3)  


 


Basophils (%) (Auto) 1 % (0-3)  


 


Neutrophils # (Auto)


 1.8 x10^3/uL


(1.8-7.7)


 


Lymphocytes # (Auto)


 1.2 x10^3/uL


(1.0-4.8)


 


Monocytes # (Auto)


 0.4 x10^3/uL


(0.0-1.1)


 


Eosinophils # (Auto)


 0.1 x10^3/uL


(0.0-0.7)


 


Basophils # (Auto)


 0.0 x10^3/uL


(0.0-0.2)


 


Sodium Level


 144 mmol/L


(136-145)


 


Potassium Level


 3.4 mmol/L


(3.5-5.1)  L


 


Chloride Level


 103 mmol/L


()


 


Carbon Dioxide Level


 30 mmol/L


(21-32)


 


Anion Gap 11 (6-14)  


 


Blood Urea Nitrogen


 8 mg/dL (7-20)





 


Creatinine


 0.7 mg/dL


(0.6-1.0)


 


Estimated GFR


(Cockcroft-Gault) 99.0  





 


BUN/Creatinine Ratio 11 (6-20)  


 


Glucose Level


 92 mg/dL


(70-99)


 


Calcium Level


 9.1 mg/dL


(8.5-10.1)


 


Magnesium Level


 1.6 mg/dL


(1.8-2.4)  L


 


Total Bilirubin


 0.5 mg/dL


(0.2-1.0)


 


Aspartate Amino Transferase


(AST) 12 U/L (15-37)


L


 


Alanine Aminotransferase (ALT)


 25 U/L (14-59)





 


Alkaline Phosphatase


 68 U/L


()


 


Total Protein


 7.2 g/dL


(6.4-8.2)


 


Albumin


 3.3 g/dL


(3.4-5.0)  L


 


Albumin/Globulin Ratio


 0.8 (1.0-1.7)


L


 


Lipase


 99 U/L


()





                                Laboratory Tests


21 14:35








                                Laboratory Tests


21 14:35








Vital Signs:





                                   Vital Signs








  Date Time  Temp Pulse Resp B/P (MAP) Pulse Ox O2 Delivery O2 Flow Rate FiO2


 


21 14:20 99.0 98 16 146/64 (97) 97 Room Air  





 99.0       











EKG:


EKG:


Sinus rhythm 84 bpm, no axis deviation, normal intervals, no obvious T wave 

inversions, ST elevations or ST depressions





Radiology/Procedures:


Radiology/Procedures:


                                 IMAGING REPORT





                                     Signed





PATIENT: JUAN DIEGO MCLAIN  ACCOUNT: [id number]     MRN#: [id number]


: 1945           LOCATION: ER              AGE: 76


SEX: F                    EXAM DT: 21         ACCESSION#: [id number]


STATUS: PRE ER            ORD. PHYSICIAN: EVON TORO DO


REASON: near-syncope


PROCEDURE: PORTABLE CHEST 1V





XR CHEST 1V





History: Reason: near-syncope / Spl. Instructions:  / History: 





Comparison: 2019





Findings:


Patchy bibasilar opacities, left than right. No pleural effusion. No pne

umothorax. Normal heart size. Stable right chest wall port. Advanced 

glenohumeral DJD, right greater than left.





Impression: 


1.  Mild left greater than right patchy bibasilar opacities, may represent 

atelectasis or developing consolidations





Electronically signed by: Maximilian Brasher DO (2021 2:51 PM) [id number]














DICTATED and SIGNED BY:     MAXIMILIAN BRASHER DO


DATE:     21 [id number] 0





Course & Med Decision Making:


Course & Med Decision Making


Pertinent Labs and Imaging studies reviewed. (See chart for details)





Concern for abnormal movements of the upper extremity in the setting of anxiety 

and multiple myeloma.  Labs show some mild anemia with no prior for comparison. 

 Normal calcium.  Patient with no neurologic symptoms indicating spinal cord 

compression.  Patient has no focal neurologic deficits.  Will do trial of hydr

oxyzine if symptoms should recur.  Will discharge home with strict ED return 

precautions were given for neurologic deficits including saddle anesthesia, 

incontinence, focal sensory or motor weakness, speech changes or facial droop. 

Encouraged urgent outpatient follow-up with PMD and neurology for definitive 

management of multiple myeloma.  Life-threatening processes were considered but 

are low suspicion at this time, given history, physical exam and ED workup. Pt 

was educated on all prescription medications and adverse effects.  All patient's

 questions were answered and pt was stable at time of discharge.





Life/limb-threatening differential includes but is not limited to, end organ 

damage/sepsis, trauma/abuse/neglect, neurologic deficit including CVA/TIA, 

alcohol/drug ingestion, toxidrome, suicidal/homicidal ideations plans or 

attempts, psychosis or mental illness resulting in self neglect and inability to

 care for self.





I have spoken with the patient and/or caregivers.  I explained the patient's 

condition, diagnoses and treatment plan based on the information available to me

 at this time.  I have answered the patient and/or caregiver's questions and 

addressed any concerns.  The patient and/or caregivers have a good understanding

 of patient's diagnosis, condition and treatment plan as can be expected at this

 point.  Vital signs have been stable.  Patient's condition is stable and 

appropriate for discharge from the emergency department. 





Patient will pursue further outpatient evaluation with primary care physician or

 other designated or consulting physician as outlined in the discharge 

instructions.  The patient and/or caregivers are agreeable to this plan of care 

and follow-up instructions have been explained in detail.  The patient and/or 

caregivers have received these instructions in written form and have expressed 

an understanding of the discharge instructions.  The patient and/or caregivers 

are aware that any significant change of condition or worsening of symptoms 

should prompt immediate return to this or the closest emergency department or 

call to 1.





Armond Disclaimer:


Dragon Disclaimer:


This electronic medical record was generated, in whole or in part, using a voice

 recognition dictation system.





Departure


Departure


Impression:  


   Primary Impression:  


   Abnormal movements


   Additional Impression:  


   Anxiety


Disposition:  01 HOME / SELF CARE / HOMELESS


Condition:  STABLE


Referrals:  


HAMILTON GUTIERREZ MD (PCP)


Follow-up with your primary care physician in 24 to 48 hours OR


FOLLOW UP WITH FAMILY MEDICINE:


[address] [facility], [address]


[place] [address]


Phone: [phone]


Patient Instructions:  Anxiety and Panic Attacks, Multiple Myeloma





Additional Instructions:  


FOLLOW UP WITH  NEUROLOGY:  FOR DEFINITIVE MANAGEMENT of multiple myeloma


[facility]


[address], [address]


[place] [address]


Phone: [phone]





EMERGENCY DEPARTMENT GENERAL DISCHARGE INSTRUCTIONS





Thank you for coming to [facility] Emergency Department (ED) 

today and 


trusting us with you care.  We trust that you had a positive experience in our 

Emergency 


Department.  If you wish to speak to the department management, you may call the

 Director at 


[phone].





YOUR FOLLOW UP INSTRUCTIONS ARE AS FOLLOWS:





1.  Do you have a private Doctor?  If you do not have a private doctor, please 

ask for a 


resource list of physicians or clinics that may be able to assist you with 

follow up care.





2.  The Emergency Physicain has interpreted your x-rays.  The X-Ray specialist 

will also 


review them.  If there is a change in the findings, you will be notified in 48 

hours when at 


all possible.





3.  A lab test or culture has been done, your results will be reviewed and you 

will be 


notified if you need a change in treatment.





ADDITIONAL INSTRUCTIONS AND INFORMATION:





1.  Your care today has been supervised by a physician who is specially trained 

in emergency 


care.  Many problems require more than one evaluation for a complete diagnosis 

and 


treatment.  We recommend that you schedule your follow up appointment as 

recommended to 


ensure complete treatment of you illness or injury.  If you are unable to obtain

 follow up 


care and continue to have a problem, or if your condition worsens, we recommend 

that you 


return to the ED.





2.  We are not able to safely determine your condition over the phone nor are we

 able to 


give sound medical advice over the phone.  For these safety reasons, if you call

 for medical 


advice we will ask you to come to the ED for further evaluation.





3.  If you have any questions regarding these discharge instructions please call

 the ED at 


[phone].





SAFETY INFORMATION:





In the interest of safety, wellness, and injury prevention; we encourage you to 

wear your 


sealbelt, if you smoke; quite smoking, and we encourage family to use a 

protective helmet 


for bicycling and other sporting events that present an increased risk for head 

injury.





IF YOUR SYMPTOMS WORSEN OR NEW SYMPTOMS DEVELOP, OR YOU HAVE CONCERNS ABOUT YOUR

 CONDITION; 


OR IF YOUR CONDITION WORSENS WHILE YOU ARE WAITING FOR YOUR FOLLOW UP 

APPOINTMENT; EITHER 


CONTACT YOUR PRIMARY CARE DOCTOR, THE PHYSICIAN WHOSE NAME AND NUMBER YOU WERE 

GIVEN, OR 


RETURN TO THE ED IMMEDIATELY.


Scripts


Hydroxyzine Hcl (HYDROXYZINE HCL) 25 Mg Tablet


1 TAB PO TID PRN for itching, #20 TAB


   Prov: EVON TORO DO         21











EVON TORO DO               Aug 22, 2021 15:06